# Patient Record
Sex: FEMALE | Race: BLACK OR AFRICAN AMERICAN | URBAN - METROPOLITAN AREA
[De-identification: names, ages, dates, MRNs, and addresses within clinical notes are randomized per-mention and may not be internally consistent; named-entity substitution may affect disease eponyms.]

---

## 2022-07-07 ENCOUNTER — OFFICE VISIT (OUTPATIENT)
Dept: URBAN - METROPOLITAN AREA CLINIC 92 | Facility: CLINIC | Age: 47
End: 2022-07-07
Payer: COMMERCIAL

## 2022-07-07 ENCOUNTER — WEB ENCOUNTER (OUTPATIENT)
Dept: URBAN - METROPOLITAN AREA CLINIC 92 | Facility: CLINIC | Age: 47
End: 2022-07-07

## 2022-07-07 VITALS
BODY MASS INDEX: 42.49 KG/M2 | TEMPERATURE: 98 F | HEART RATE: 101 BPM | SYSTOLIC BLOOD PRESSURE: 122 MMHG | HEIGHT: 65 IN | WEIGHT: 255 LBS | DIASTOLIC BLOOD PRESSURE: 89 MMHG

## 2022-07-07 DIAGNOSIS — Z83.79 FAMILY HISTORY OF CROHN'S DISEASE: ICD-10-CM

## 2022-07-07 DIAGNOSIS — E66.9 OBESITY (BMI 30-39.9): ICD-10-CM

## 2022-07-07 DIAGNOSIS — R19.7 DIARRHEA, UNSPECIFIED TYPE: ICD-10-CM

## 2022-07-07 DIAGNOSIS — R10.813 RIGHT LOWER QUADRANT ABDOMINAL TENDERNESS: ICD-10-CM

## 2022-07-07 PROCEDURE — 99204 OFFICE O/P NEW MOD 45 MIN: CPT | Performed by: INTERNAL MEDICINE

## 2022-07-07 RX ORDER — INSULIN DETEMIR 100 [IU]/ML
INJECT 78 UNITS SUBCUTANEOUSLY ONCE DAILY INJECTION, SOLUTION SUBCUTANEOUS
Qty: 30 MILLILITER | Refills: 0 | Status: ACTIVE | COMMUNITY

## 2022-07-07 RX ORDER — OMEPRAZOLE 20 MG/1
CAPSULE, DELAYED RELEASE ORAL
Qty: 30 CAPSULE | Status: ACTIVE | COMMUNITY

## 2022-07-07 RX ORDER — PREGABALIN 150 MG/1
CAPSULE ORAL
Qty: 180 CAPSULE | Status: ACTIVE | COMMUNITY

## 2022-07-07 RX ORDER — METOPROLOL SUCCINATE 25 MG/1
TABLET, EXTENDED RELEASE ORAL
Qty: 180 TABLET | Status: ACTIVE | COMMUNITY

## 2022-07-07 RX ORDER — DULOXETINE HYDROCHLORIDE 60 MG/1
CAPSULE, DELAYED RELEASE ORAL
Qty: 90 CAPSULE | Status: ACTIVE | COMMUNITY

## 2022-07-07 RX ORDER — ALBUTEROL SULFATE 90 UG/1
INHALE 2 PUFFS BY MOUTH EVERY 6 HOURS AS NEEDED FOR SHORTNESS OF BREATH AEROSOL, METERED RESPIRATORY (INHALATION)
Qty: 18 GRAM | Refills: 2 | Status: ACTIVE | COMMUNITY

## 2022-07-07 RX ORDER — ROSUVASTATIN CALCIUM 40 MG/1
TABLET, FILM COATED ORAL
Qty: 90 TABLET | Status: ACTIVE | COMMUNITY

## 2022-07-07 RX ORDER — INSULIN ASPART 100 [IU]/ML
TAKE 36 UNITS SUBCUTANEOUSLY IN THE MORNING, 38 UNITS AT LUNCH, AND 36 UNITS AT DINNER. SLIDING SCALE IF GLUCOSE OVER 150, ADD 2 UNITS FOR EVERY 50 GLUCOSE READING INJECTION, SOLUTION INTRAVENOUS; SUBCUTANEOUS
Qty: 15 MILLILITER | Refills: 0 | Status: ACTIVE | COMMUNITY

## 2022-07-07 NOTE — HPI-TODAY'S VISIT:
This is a 45 yo female with a PMHx of lupus who presents with a 10 month history of diarrhea.  She was having up to 14 stools a day.  She wakes up in the middle of the night to have a bowel movement.  She also notes blood on the tissue when wiping.  She denies weight loss.  Her last colonoscopy 3 years ago was unremarkable.  She states there has been no change in her medication.  She has not had stool samples.   She reports bilateral pelvic pain for 6 months which precedes a bowel movement.  Pain is described as a cramp. Her mother was diagnosed with colon cancer in her early 60s.  Her daughter has Crohn's disease.   The patient also reports mouth ulcers which developed November 2021 treated with steroids and oral lidocaine.

## 2022-07-20 LAB
A/G RATIO: 0.9
ALBUMIN: 2.9
ALKALINE PHOSPHATASE: 113
ALT (SGPT): 6
AST (SGOT): 19
BILIRUBIN, TOTAL: <0.2
BUN/CREATININE RATIO: 6
BUN: 5
CALCIUM: 8.6
CALPROTECTIN, FECAL: <16
CARBON DIOXIDE, TOTAL: 21
CHLORIDE: 105
CREATININE: 0.77
DEAMIDATED GLIADIN ABS, IGA: 2
DEAMIDATED GLIADIN ABS, IGG: 3
EGFR: 95
ENDOMYSIAL ANTIBODY IGA: NEGATIVE
GLOBULIN, TOTAL: 3.4
GLUCOSE: (no result)
HEMATOCRIT: 33.8
HEMOGLOBIN: 10.9
IMMUNOGLOBULIN A, QN, SERUM: 172
MCH: 26.9
MCHC: 32.2
MCV: 84
NRBC: (no result)
PANCREATIC ELASTASE, FECAL: 403
PLATELETS: 250
POTASSIUM: (no result)
PROTEIN, TOTAL: 6.3
RBC: 4.05
RDW: 15.8
SODIUM: 144
T-TRANSGLUTAMINASE (TTG) IGA: <2
T-TRANSGLUTAMINASE (TTG) IGG: 5
T4,FREE(DIRECT): 0.92
TSH: 3.06
VITAMIN B12: 150
VITAMIN D, 25-HYDROXY: 14.8
WBC: 4

## 2022-07-21 ENCOUNTER — TELEPHONE ENCOUNTER (OUTPATIENT)
Dept: URBAN - METROPOLITAN AREA CLINIC 92 | Facility: CLINIC | Age: 47
End: 2022-07-21

## 2022-07-21 RX ORDER — INSULIN DETEMIR 100 [IU]/ML
INJECT 78 UNITS SUBCUTANEOUSLY ONCE DAILY INJECTION, SOLUTION SUBCUTANEOUS
Qty: 30 MILLILITER | Refills: 0 | Status: ACTIVE | COMMUNITY

## 2022-07-21 RX ORDER — ROSUVASTATIN CALCIUM 40 MG/1
TABLET, FILM COATED ORAL
Qty: 90 TABLET | Status: ACTIVE | COMMUNITY

## 2022-07-21 RX ORDER — OMEPRAZOLE 20 MG/1
CAPSULE, DELAYED RELEASE ORAL
Qty: 30 CAPSULE | Status: ACTIVE | COMMUNITY

## 2022-07-21 RX ORDER — METOPROLOL SUCCINATE 25 MG/1
TABLET, EXTENDED RELEASE ORAL
Qty: 180 TABLET | Status: ACTIVE | COMMUNITY

## 2022-07-21 RX ORDER — PREGABALIN 150 MG/1
CAPSULE ORAL
Qty: 180 CAPSULE | Status: ACTIVE | COMMUNITY

## 2022-07-21 RX ORDER — ALBUTEROL SULFATE 90 UG/1
INHALE 2 PUFFS BY MOUTH EVERY 6 HOURS AS NEEDED FOR SHORTNESS OF BREATH AEROSOL, METERED RESPIRATORY (INHALATION)
Qty: 18 GRAM | Refills: 2 | Status: ACTIVE | COMMUNITY

## 2022-07-21 RX ORDER — INSULIN ASPART 100 [IU]/ML
TAKE 36 UNITS SUBCUTANEOUSLY IN THE MORNING, 38 UNITS AT LUNCH, AND 36 UNITS AT DINNER. SLIDING SCALE IF GLUCOSE OVER 150, ADD 2 UNITS FOR EVERY 50 GLUCOSE READING INJECTION, SOLUTION INTRAVENOUS; SUBCUTANEOUS
Qty: 15 MILLILITER | Refills: 0 | Status: ACTIVE | COMMUNITY

## 2022-07-21 RX ORDER — DULOXETINE HYDROCHLORIDE 60 MG/1
CAPSULE, DELAYED RELEASE ORAL
Qty: 90 CAPSULE | Status: ACTIVE | COMMUNITY

## 2022-07-21 RX ORDER — ERGOCALCIFEROL 1.25 MG/1
1 CAPSULE CAPSULE ORAL
Qty: 14 | Refills: 0 | OUTPATIENT
Start: 2022-07-21 | End: 2022-10-19

## 2022-07-22 LAB — GASTROINTESTINAL PATHOGEN: (no result)

## 2022-07-29 ENCOUNTER — OFFICE VISIT (OUTPATIENT)
Dept: URBAN - METROPOLITAN AREA CLINIC 91 | Facility: CLINIC | Age: 47
End: 2022-07-29
Payer: COMMERCIAL

## 2022-07-29 DIAGNOSIS — E53.8 B12 DEFICIENCY: ICD-10-CM

## 2022-07-29 PROCEDURE — 96372 THER/PROPH/DIAG INJ SC/IM: CPT | Performed by: INTERNAL MEDICINE

## 2022-07-29 RX ORDER — PREGABALIN 150 MG/1
CAPSULE ORAL
Qty: 180 CAPSULE | Status: ACTIVE | COMMUNITY

## 2022-07-29 RX ORDER — OMEPRAZOLE 20 MG/1
CAPSULE, DELAYED RELEASE ORAL
Qty: 30 CAPSULE | Status: ACTIVE | COMMUNITY

## 2022-07-29 RX ORDER — INSULIN ASPART 100 [IU]/ML
TAKE 36 UNITS SUBCUTANEOUSLY IN THE MORNING, 38 UNITS AT LUNCH, AND 36 UNITS AT DINNER. SLIDING SCALE IF GLUCOSE OVER 150, ADD 2 UNITS FOR EVERY 50 GLUCOSE READING INJECTION, SOLUTION INTRAVENOUS; SUBCUTANEOUS
Qty: 15 MILLILITER | Refills: 0 | Status: ACTIVE | COMMUNITY

## 2022-07-29 RX ORDER — ERGOCALCIFEROL 1.25 MG/1
1 CAPSULE CAPSULE ORAL
Qty: 14 | Refills: 0 | Status: ACTIVE | COMMUNITY
Start: 2022-07-21 | End: 2022-10-19

## 2022-07-29 RX ORDER — INSULIN DETEMIR 100 [IU]/ML
INJECT 78 UNITS SUBCUTANEOUSLY ONCE DAILY INJECTION, SOLUTION SUBCUTANEOUS
Qty: 30 MILLILITER | Refills: 0 | Status: ACTIVE | COMMUNITY

## 2022-07-29 RX ORDER — ALBUTEROL SULFATE 90 UG/1
INHALE 2 PUFFS BY MOUTH EVERY 6 HOURS AS NEEDED FOR SHORTNESS OF BREATH AEROSOL, METERED RESPIRATORY (INHALATION)
Qty: 18 GRAM | Refills: 2 | Status: ACTIVE | COMMUNITY

## 2022-07-29 RX ORDER — ROSUVASTATIN CALCIUM 40 MG/1
TABLET, FILM COATED ORAL
Qty: 90 TABLET | Status: ACTIVE | COMMUNITY

## 2022-07-29 RX ORDER — METOPROLOL SUCCINATE 25 MG/1
TABLET, EXTENDED RELEASE ORAL
Qty: 180 TABLET | Status: ACTIVE | COMMUNITY

## 2022-07-29 RX ORDER — DULOXETINE HYDROCHLORIDE 60 MG/1
CAPSULE, DELAYED RELEASE ORAL
Qty: 90 CAPSULE | Status: ACTIVE | COMMUNITY

## 2022-08-05 ENCOUNTER — CLAIMS CREATED FROM THE CLAIM WINDOW (OUTPATIENT)
Dept: URBAN - METROPOLITAN AREA CLINIC 91 | Facility: CLINIC | Age: 47
End: 2022-08-05
Payer: COMMERCIAL

## 2022-08-05 DIAGNOSIS — E53.8 B12 DEFICIENCY: ICD-10-CM

## 2022-08-05 PROCEDURE — 96372 THER/PROPH/DIAG INJ SC/IM: CPT | Performed by: INTERNAL MEDICINE

## 2022-08-05 RX ORDER — ERGOCALCIFEROL 1.25 MG/1
1 CAPSULE CAPSULE ORAL
Qty: 14 | Refills: 0 | Status: ACTIVE | COMMUNITY
Start: 2022-07-21 | End: 2022-10-19

## 2022-08-05 RX ORDER — ROSUVASTATIN CALCIUM 40 MG/1
TABLET, FILM COATED ORAL
Qty: 90 TABLET | Status: ACTIVE | COMMUNITY

## 2022-08-05 RX ORDER — INSULIN DETEMIR 100 [IU]/ML
INJECT 78 UNITS SUBCUTANEOUSLY ONCE DAILY INJECTION, SOLUTION SUBCUTANEOUS
Qty: 30 MILLILITER | Refills: 0 | Status: ACTIVE | COMMUNITY

## 2022-08-05 RX ORDER — OMEPRAZOLE 20 MG/1
CAPSULE, DELAYED RELEASE ORAL
Qty: 30 CAPSULE | Status: ACTIVE | COMMUNITY

## 2022-08-05 RX ORDER — ALBUTEROL SULFATE 90 UG/1
INHALE 2 PUFFS BY MOUTH EVERY 6 HOURS AS NEEDED FOR SHORTNESS OF BREATH AEROSOL, METERED RESPIRATORY (INHALATION)
Qty: 18 GRAM | Refills: 2 | Status: ACTIVE | COMMUNITY

## 2022-08-05 RX ORDER — METOPROLOL SUCCINATE 25 MG/1
TABLET, EXTENDED RELEASE ORAL
Qty: 180 TABLET | Status: ACTIVE | COMMUNITY

## 2022-08-05 RX ORDER — PREGABALIN 150 MG/1
CAPSULE ORAL
Qty: 180 CAPSULE | Status: ACTIVE | COMMUNITY

## 2022-08-05 RX ORDER — INSULIN ASPART 100 [IU]/ML
TAKE 36 UNITS SUBCUTANEOUSLY IN THE MORNING, 38 UNITS AT LUNCH, AND 36 UNITS AT DINNER. SLIDING SCALE IF GLUCOSE OVER 150, ADD 2 UNITS FOR EVERY 50 GLUCOSE READING INJECTION, SOLUTION INTRAVENOUS; SUBCUTANEOUS
Qty: 15 MILLILITER | Refills: 0 | Status: ACTIVE | COMMUNITY

## 2022-08-05 RX ORDER — DULOXETINE HYDROCHLORIDE 60 MG/1
CAPSULE, DELAYED RELEASE ORAL
Qty: 90 CAPSULE | Status: ACTIVE | COMMUNITY

## 2022-08-12 ENCOUNTER — OFFICE VISIT (OUTPATIENT)
Dept: URBAN - METROPOLITAN AREA CLINIC 91 | Facility: CLINIC | Age: 47
End: 2022-08-12
Payer: COMMERCIAL

## 2022-08-12 DIAGNOSIS — E53.8 B12 DEFICIENCY: ICD-10-CM

## 2022-08-12 PROCEDURE — 96372 THER/PROPH/DIAG INJ SC/IM: CPT | Performed by: INTERNAL MEDICINE

## 2022-08-12 RX ORDER — INSULIN DETEMIR 100 [IU]/ML
INJECT 78 UNITS SUBCUTANEOUSLY ONCE DAILY INJECTION, SOLUTION SUBCUTANEOUS
Qty: 30 MILLILITER | Refills: 0 | Status: ACTIVE | COMMUNITY

## 2022-08-12 RX ORDER — DULOXETINE HYDROCHLORIDE 60 MG/1
CAPSULE, DELAYED RELEASE ORAL
Qty: 90 CAPSULE | Status: ACTIVE | COMMUNITY

## 2022-08-12 RX ORDER — ERGOCALCIFEROL 1.25 MG/1
1 CAPSULE CAPSULE ORAL
Qty: 14 | Refills: 0 | Status: ACTIVE | COMMUNITY
Start: 2022-07-21 | End: 2022-10-19

## 2022-08-12 RX ORDER — OMEPRAZOLE 20 MG/1
CAPSULE, DELAYED RELEASE ORAL
Qty: 30 CAPSULE | Status: ACTIVE | COMMUNITY

## 2022-08-12 RX ORDER — PREGABALIN 150 MG/1
CAPSULE ORAL
Qty: 180 CAPSULE | Status: ACTIVE | COMMUNITY

## 2022-08-12 RX ORDER — INSULIN ASPART 100 [IU]/ML
TAKE 36 UNITS SUBCUTANEOUSLY IN THE MORNING, 38 UNITS AT LUNCH, AND 36 UNITS AT DINNER. SLIDING SCALE IF GLUCOSE OVER 150, ADD 2 UNITS FOR EVERY 50 GLUCOSE READING INJECTION, SOLUTION INTRAVENOUS; SUBCUTANEOUS
Qty: 15 MILLILITER | Refills: 0 | Status: ACTIVE | COMMUNITY

## 2022-08-12 RX ORDER — ALBUTEROL SULFATE 90 UG/1
INHALE 2 PUFFS BY MOUTH EVERY 6 HOURS AS NEEDED FOR SHORTNESS OF BREATH AEROSOL, METERED RESPIRATORY (INHALATION)
Qty: 18 GRAM | Refills: 2 | Status: ACTIVE | COMMUNITY

## 2022-08-12 RX ORDER — METOPROLOL SUCCINATE 25 MG/1
TABLET, EXTENDED RELEASE ORAL
Qty: 180 TABLET | Status: ACTIVE | COMMUNITY

## 2022-08-12 RX ORDER — ROSUVASTATIN CALCIUM 40 MG/1
TABLET, FILM COATED ORAL
Qty: 90 TABLET | Status: ACTIVE | COMMUNITY

## 2022-08-16 ENCOUNTER — TELEPHONE ENCOUNTER (OUTPATIENT)
Dept: URBAN - METROPOLITAN AREA CLINIC 17 | Facility: CLINIC | Age: 47
End: 2022-08-16

## 2022-08-19 ENCOUNTER — CLAIMS CREATED FROM THE CLAIM WINDOW (OUTPATIENT)
Dept: URBAN - METROPOLITAN AREA CLINIC 91 | Facility: CLINIC | Age: 47
End: 2022-08-19

## 2022-08-19 ENCOUNTER — OFFICE VISIT (OUTPATIENT)
Dept: URBAN - METROPOLITAN AREA CLINIC 91 | Facility: CLINIC | Age: 47
End: 2022-08-19

## 2022-08-19 PROCEDURE — 996 AG2 (NON BILLABLE): Performed by: INTERNAL MEDICINE

## 2022-08-25 ENCOUNTER — CLAIMS CREATED FROM THE CLAIM WINDOW (OUTPATIENT)
Dept: URBAN - METROPOLITAN AREA CLINIC 92 | Facility: CLINIC | Age: 47
End: 2022-08-25
Payer: COMMERCIAL

## 2022-08-25 ENCOUNTER — TELEPHONE ENCOUNTER (OUTPATIENT)
Dept: URBAN - METROPOLITAN AREA CLINIC 92 | Facility: CLINIC | Age: 47
End: 2022-08-25

## 2022-08-25 ENCOUNTER — OFFICE VISIT (OUTPATIENT)
Dept: URBAN - METROPOLITAN AREA CLINIC 92 | Facility: CLINIC | Age: 47
End: 2022-08-25

## 2022-08-25 ENCOUNTER — CLAIMS CREATED FROM THE CLAIM WINDOW (OUTPATIENT)
Dept: URBAN - METROPOLITAN AREA CLINIC 92 | Facility: CLINIC | Age: 47
End: 2022-08-25

## 2022-08-25 DIAGNOSIS — E53.8 B12 DEFICIENCY: ICD-10-CM

## 2022-08-25 PROCEDURE — 96372 THER/PROPH/DIAG INJ SC/IM: CPT | Performed by: INTERNAL MEDICINE

## 2022-08-25 RX ORDER — PREGABALIN 150 MG/1
CAPSULE ORAL
Qty: 180 CAPSULE | Status: ACTIVE | COMMUNITY

## 2022-08-25 RX ORDER — ALBUTEROL SULFATE 90 UG/1
INHALE 2 PUFFS BY MOUTH EVERY 6 HOURS AS NEEDED FOR SHORTNESS OF BREATH AEROSOL, METERED RESPIRATORY (INHALATION)
Qty: 18 GRAM | Refills: 2 | Status: ACTIVE | COMMUNITY

## 2022-08-25 RX ORDER — ROSUVASTATIN CALCIUM 40 MG/1
TABLET, FILM COATED ORAL
Qty: 90 TABLET | Status: ACTIVE | COMMUNITY

## 2022-08-25 RX ORDER — INSULIN ASPART 100 [IU]/ML
TAKE 36 UNITS SUBCUTANEOUSLY IN THE MORNING, 38 UNITS AT LUNCH, AND 36 UNITS AT DINNER. SLIDING SCALE IF GLUCOSE OVER 150, ADD 2 UNITS FOR EVERY 50 GLUCOSE READING INJECTION, SOLUTION INTRAVENOUS; SUBCUTANEOUS
Qty: 15 MILLILITER | Refills: 0 | Status: ACTIVE | COMMUNITY

## 2022-08-25 RX ORDER — CYANOCOBALAMIN 1000 UG/ML
1 ML INJECTION INTRAMUSCULAR; SUBCUTANEOUS
Qty: 30 ML | Refills: 12 | OUTPATIENT
Start: 2022-08-25 | End: 2023-09-19

## 2022-08-25 RX ORDER — METOPROLOL SUCCINATE 25 MG/1
TABLET, EXTENDED RELEASE ORAL
Qty: 180 TABLET | Status: ACTIVE | COMMUNITY

## 2022-08-25 RX ORDER — OMEPRAZOLE 20 MG/1
CAPSULE, DELAYED RELEASE ORAL
Qty: 30 CAPSULE | Status: ACTIVE | COMMUNITY

## 2022-08-25 RX ORDER — ERGOCALCIFEROL 1.25 MG/1
1 CAPSULE CAPSULE ORAL
Qty: 14 | Refills: 0 | Status: ACTIVE | COMMUNITY
Start: 2022-07-21 | End: 2022-10-19

## 2022-08-25 RX ORDER — DULOXETINE HYDROCHLORIDE 60 MG/1
CAPSULE, DELAYED RELEASE ORAL
Qty: 90 CAPSULE | Status: ACTIVE | COMMUNITY

## 2022-08-25 RX ORDER — INSULIN DETEMIR 100 [IU]/ML
INJECT 78 UNITS SUBCUTANEOUSLY ONCE DAILY INJECTION, SOLUTION SUBCUTANEOUS
Qty: 30 MILLILITER | Refills: 0 | Status: ACTIVE | COMMUNITY

## 2022-10-14 ENCOUNTER — TELEPHONE ENCOUNTER (OUTPATIENT)
Dept: URBAN - METROPOLITAN AREA CLINIC 92 | Facility: CLINIC | Age: 47
End: 2022-10-14

## 2022-10-18 ENCOUNTER — LAB OUTSIDE AN ENCOUNTER (OUTPATIENT)
Dept: URBAN - METROPOLITAN AREA CLINIC 17 | Facility: CLINIC | Age: 47
End: 2022-10-18

## 2022-10-19 LAB
HEMATOCRIT: 31.9
HEMOGLOBIN: 9.8
MCH: 25.7
MCHC: 30.7
MCV: 83.5
MPV: (no result)
PLATELET COUNT: 141
RDW: 15.8
RED BLOOD CELL COUNT: 3.82
VITAMIN B12: 477
WHITE BLOOD CELL COUNT: 3.7

## 2022-10-20 ENCOUNTER — OFFICE VISIT (OUTPATIENT)
Dept: URBAN - METROPOLITAN AREA SURGERY CENTER 16 | Facility: SURGERY CENTER | Age: 47
End: 2022-10-20

## 2022-10-24 ENCOUNTER — CLAIMS CREATED FROM THE CLAIM WINDOW (OUTPATIENT)
Dept: URBAN - METROPOLITAN AREA TELEHEALTH 2 | Facility: TELEHEALTH | Age: 47
End: 2022-10-24
Payer: COMMERCIAL

## 2022-10-24 ENCOUNTER — TELEPHONE ENCOUNTER (OUTPATIENT)
Dept: URBAN - METROPOLITAN AREA CLINIC 92 | Facility: CLINIC | Age: 47
End: 2022-10-24

## 2022-10-24 VITALS — WEIGHT: 255 LBS | HEIGHT: 65 IN | BODY MASS INDEX: 42.49 KG/M2

## 2022-10-24 DIAGNOSIS — I82.3 RENAL VEIN THROMBOSIS: ICD-10-CM

## 2022-10-24 DIAGNOSIS — E66.9 OBESITY (BMI 30-39.9): ICD-10-CM

## 2022-10-24 DIAGNOSIS — E85.9 AMYLOIDOSIS, UNSPECIFIED TYPE: ICD-10-CM

## 2022-10-24 DIAGNOSIS — E55.9 VITAMIN D DEFICIENCY: ICD-10-CM

## 2022-10-24 DIAGNOSIS — R19.7 DIARRHEA, UNSPECIFIED TYPE: ICD-10-CM

## 2022-10-24 DIAGNOSIS — E53.8 B12 DEFICIENCY: ICD-10-CM

## 2022-10-24 PROBLEM — 17602002: Status: ACTIVE | Noted: 2022-10-24

## 2022-10-24 PROBLEM — 15842009: Status: ACTIVE | Noted: 2022-10-24

## 2022-10-24 PROBLEM — 162864005: Status: ACTIVE | Noted: 2022-07-07

## 2022-10-24 PROBLEM — 34713006: Status: ACTIVE | Noted: 2022-07-21

## 2022-10-24 PROCEDURE — 99214 OFFICE O/P EST MOD 30 MIN: CPT | Performed by: INTERNAL MEDICINE

## 2022-10-24 RX ORDER — ERGOCALCIFEROL 1.25 MG/1
1 CAPSULE CAPSULE ORAL
Qty: 14 | Refills: 1 | OUTPATIENT
Start: 2022-10-24 | End: 2023-04-22

## 2022-10-24 RX ORDER — ROSUVASTATIN CALCIUM 40 MG/1
TABLET, FILM COATED ORAL
Qty: 90 TABLET | Status: ACTIVE | COMMUNITY

## 2022-10-24 RX ORDER — METOPROLOL SUCCINATE 25 MG/1
TABLET, EXTENDED RELEASE ORAL
Qty: 180 TABLET | Status: ACTIVE | COMMUNITY

## 2022-10-24 RX ORDER — INSULIN DETEMIR 100 [IU]/ML
INJECT 78 UNITS SUBCUTANEOUSLY ONCE DAILY INJECTION, SOLUTION SUBCUTANEOUS
Qty: 30 MILLILITER | Refills: 0 | Status: ACTIVE | COMMUNITY

## 2022-10-24 RX ORDER — OMEPRAZOLE 20 MG/1
CAPSULE, DELAYED RELEASE ORAL
Qty: 30 CAPSULE | Status: ACTIVE | COMMUNITY

## 2022-10-24 RX ORDER — ALBUTEROL SULFATE 90 UG/1
INHALE 2 PUFFS BY MOUTH EVERY 6 HOURS AS NEEDED FOR SHORTNESS OF BREATH AEROSOL, METERED RESPIRATORY (INHALATION)
Qty: 18 GRAM | Refills: 2 | Status: ACTIVE | COMMUNITY

## 2022-10-24 RX ORDER — DULOXETINE HYDROCHLORIDE 60 MG/1
CAPSULE, DELAYED RELEASE ORAL
Qty: 90 CAPSULE | Status: ACTIVE | COMMUNITY

## 2022-10-24 RX ORDER — PREGABALIN 150 MG/1
CAPSULE ORAL
Qty: 180 CAPSULE | Status: ACTIVE | COMMUNITY

## 2022-10-24 RX ORDER — INSULIN ASPART 100 [IU]/ML
TAKE 36 UNITS SUBCUTANEOUSLY IN THE MORNING, 38 UNITS AT LUNCH, AND 36 UNITS AT DINNER. SLIDING SCALE IF GLUCOSE OVER 150, ADD 2 UNITS FOR EVERY 50 GLUCOSE READING INJECTION, SOLUTION INTRAVENOUS; SUBCUTANEOUS
Qty: 15 MILLILITER | Refills: 0 | Status: ACTIVE | COMMUNITY

## 2022-10-24 RX ORDER — CYANOCOBALAMIN 1000 UG/ML
1 ML INJECTION INTRAMUSCULAR; SUBCUTANEOUS
Qty: 30 ML | Refills: 12 | Status: ACTIVE | COMMUNITY
Start: 2022-08-25 | End: 2023-09-19

## 2022-10-24 NOTE — HPI-OTHER HISTORIES
(July 2022) This is a 45 yo female with a PMHx of lupus who presents with a 10 month history of diarrhea.  She was having up to 14 stools a day.  She wakes up in the middle of the night to have a bowel movement.  She also notes blood on the tissue when wiping.  She denies weight loss.  Her last colonoscopy 3 years ago was unremarkable.  She states there has been no change in her medication.  She has not had stool samples.   She reports bilateral pelvic pain for 6 months which precedes a bowel movement.  Pain is described as a cramp. Her mother was diagnosed with colon cancer in her early 60s.  Her daughter has Crohn's disease.   The patient also reports mouth ulcers which developed November 2021 treated with steroids and oral lidocaine.

## 2022-10-24 NOTE — HPI-TODAY'S VISIT:
This is a 47 yo female with a PMHx of lupus and a family history of lupus and colon cancer (mother) who is here for follow up of diarrhea.  The patient was last seen July 2022.  At that time an EGD/colonoscopy were scheduled for October 20th.  August 2022 she presented to the hospital with abdominal pain.  A CT of the abdomen demonstrated a thrombus in the left renal vein.  It was not clear if this was due to lupus nephritis therefore she was admitted for further evaluation.  She was discharged on eliquis.  The EGD/colon was postponed since she was on anticoagulation.  Stool studies July 2022 were negative for fecal calprotectin.  B12 was very low at 150.  B12 injections were initiated.  Vitamin D was low at 14.  She is now on weekly vitamin D. Patient continues to have significant diarrhea.  She was recently diagnosed with amyloidosis.  She had a bone marrow biopsy done.  The path was sent to the [facility].  Multiple Myeloma is suspected.

## 2023-01-12 ENCOUNTER — OFFICE VISIT (OUTPATIENT)
Dept: URBAN - METROPOLITAN AREA CLINIC 92 | Facility: CLINIC | Age: 48
End: 2023-01-12

## 2023-01-12 RX ORDER — ERGOCALCIFEROL 1.25 MG/1
1 CAPSULE CAPSULE ORAL
Qty: 14 | Refills: 1 | COMMUNITY
Start: 2022-10-24 | End: 2023-04-22

## 2023-01-12 RX ORDER — INSULIN DETEMIR 100 [IU]/ML
INJECT 78 UNITS SUBCUTANEOUSLY ONCE DAILY INJECTION, SOLUTION SUBCUTANEOUS
Qty: 30 MILLILITER | Refills: 0 | COMMUNITY

## 2023-01-12 RX ORDER — ROSUVASTATIN CALCIUM 40 MG/1
TABLET, FILM COATED ORAL
Qty: 90 TABLET | COMMUNITY

## 2023-01-12 RX ORDER — PREGABALIN 150 MG/1
CAPSULE ORAL
Qty: 180 CAPSULE | COMMUNITY

## 2023-01-12 RX ORDER — OMEPRAZOLE 20 MG/1
CAPSULE, DELAYED RELEASE ORAL
Qty: 30 CAPSULE | COMMUNITY

## 2023-01-12 RX ORDER — DULOXETINE HYDROCHLORIDE 60 MG/1
CAPSULE, DELAYED RELEASE ORAL
Qty: 90 CAPSULE | COMMUNITY

## 2023-01-12 RX ORDER — METOPROLOL SUCCINATE 25 MG/1
TABLET, EXTENDED RELEASE ORAL
Qty: 180 TABLET | COMMUNITY

## 2023-01-12 RX ORDER — CYANOCOBALAMIN 1000 UG/ML
1 ML INJECTION INTRAMUSCULAR; SUBCUTANEOUS
Qty: 30 ML | Refills: 12 | COMMUNITY
Start: 2022-08-25 | End: 2023-09-19

## 2023-01-12 RX ORDER — INSULIN ASPART 100 [IU]/ML
TAKE 36 UNITS SUBCUTANEOUSLY IN THE MORNING, 38 UNITS AT LUNCH, AND 36 UNITS AT DINNER. SLIDING SCALE IF GLUCOSE OVER 150, ADD 2 UNITS FOR EVERY 50 GLUCOSE READING INJECTION, SOLUTION INTRAVENOUS; SUBCUTANEOUS
Qty: 15 MILLILITER | Refills: 0 | COMMUNITY

## 2023-01-12 RX ORDER — ALBUTEROL SULFATE 90 UG/1
INHALE 2 PUFFS BY MOUTH EVERY 6 HOURS AS NEEDED FOR SHORTNESS OF BREATH AEROSOL, METERED RESPIRATORY (INHALATION)
Qty: 18 GRAM | Refills: 2 | COMMUNITY

## 2023-06-28 ENCOUNTER — WEB ENCOUNTER (OUTPATIENT)
Dept: URBAN - METROPOLITAN AREA CLINIC 92 | Facility: CLINIC | Age: 48
End: 2023-06-28

## 2023-06-28 ENCOUNTER — OFFICE VISIT (OUTPATIENT)
Dept: URBAN - METROPOLITAN AREA CLINIC 92 | Facility: CLINIC | Age: 48
End: 2023-06-28
Payer: COMMERCIAL

## 2023-06-28 ENCOUNTER — LAB OUTSIDE AN ENCOUNTER (OUTPATIENT)
Dept: URBAN - METROPOLITAN AREA CLINIC 92 | Facility: CLINIC | Age: 48
End: 2023-06-28

## 2023-06-28 VITALS
HEART RATE: 94 BPM | TEMPERATURE: 97.1 F | DIASTOLIC BLOOD PRESSURE: 85 MMHG | WEIGHT: 289 LBS | HEIGHT: 65 IN | BODY MASS INDEX: 48.15 KG/M2 | SYSTOLIC BLOOD PRESSURE: 123 MMHG

## 2023-06-28 DIAGNOSIS — E53.8 B12 DEFICIENCY: ICD-10-CM

## 2023-06-28 DIAGNOSIS — R19.7 DIARRHEA, UNSPECIFIED TYPE: ICD-10-CM

## 2023-06-28 PROCEDURE — 99215 OFFICE O/P EST HI 40 MIN: CPT | Performed by: INTERNAL MEDICINE

## 2023-06-28 RX ORDER — CYANOCOBALAMIN 1000 UG/ML
1 ML INJECTION INTRAMUSCULAR; SUBCUTANEOUS
Qty: 30 ML | Refills: 12 | Status: DISCONTINUED | COMMUNITY
Start: 2022-08-25 | End: 2023-09-19

## 2023-06-28 RX ORDER — ROSUVASTATIN CALCIUM 40 MG/1
1 TABLET TABLET, COATED ORAL ONCE A DAY
Status: ACTIVE | COMMUNITY

## 2023-06-28 RX ORDER — INSULIN ASPART 100 [IU]/ML
TAKE 36 UNITS SUBCUTANEOUSLY IN THE MORNING, 38 UNITS AT LUNCH, AND 36 UNITS AT DINNER. SLIDING SCALE IF GLUCOSE OVER 150, ADD 2 UNITS FOR EVERY 50 GLUCOSE READING INJECTION, SOLUTION INTRAVENOUS; SUBCUTANEOUS
Qty: 15 MILLILITER | Refills: 0 | Status: DISCONTINUED | COMMUNITY

## 2023-06-28 RX ORDER — ROSUVASTATIN CALCIUM 40 MG/1
TABLET, FILM COATED ORAL
Qty: 90 TABLET | Status: DISCONTINUED | COMMUNITY

## 2023-06-28 RX ORDER — PREDNISONE 5 MG/1
1 TABLET TABLET ORAL ONCE A DAY
Status: ACTIVE | COMMUNITY

## 2023-06-28 RX ORDER — INSULIN ASPART 100 [IU]/ML
AS DIRECTED INJECTION, SOLUTION INTRAVENOUS; SUBCUTANEOUS
Status: ACTIVE | COMMUNITY

## 2023-06-28 RX ORDER — OMEPRAZOLE 20 MG/1
CAPSULE, DELAYED RELEASE ORAL
Qty: 30 CAPSULE | Status: DISCONTINUED | COMMUNITY

## 2023-06-28 RX ORDER — BUMETANIDE 2 MG/1
1 TABLET TABLET ORAL ONCE A DAY
Status: ACTIVE | COMMUNITY

## 2023-06-28 RX ORDER — INSULIN DETEMIR 100 [IU]/ML
INJECT 78 UNITS SUBCUTANEOUSLY ONCE DAILY INJECTION, SOLUTION SUBCUTANEOUS
Qty: 30 MILLILITER | Refills: 0 | Status: DISCONTINUED | COMMUNITY

## 2023-06-28 RX ORDER — FOLIC ACID 0.8 MG
1 TABLET TABLET ORAL ONCE A DAY
Status: ACTIVE | COMMUNITY

## 2023-06-28 RX ORDER — DULOXETINE 60 MG/1
1 CAPSULE CAPSULE, DELAYED RELEASE PELLETS ORAL ONCE A DAY
Status: ACTIVE | COMMUNITY

## 2023-06-28 RX ORDER — ONDANSETRON 8 MG/1
1 TABLET ON THE TONGUE AND ALLOW TO DISSOLVE  AS NEEDED TABLET, ORALLY DISINTEGRATING ORAL ONCE A DAY
Status: ACTIVE | COMMUNITY

## 2023-06-28 RX ORDER — PREGABALIN 150 MG/1
1 CAPSULE CAPSULE ORAL ONCE A DAY
Status: ACTIVE | COMMUNITY

## 2023-06-28 RX ORDER — ALBUTEROL SULFATE 90 UG/1
INHALE 2 PUFFS BY MOUTH EVERY 6 HOURS AS NEEDED FOR SHORTNESS OF BREATH AEROSOL, METERED RESPIRATORY (INHALATION)
Qty: 18 GRAM | Refills: 2 | Status: DISCONTINUED | COMMUNITY

## 2023-06-28 RX ORDER — METHOCARBAMOL 500 MG/1
1.5 TABLETS TABLET ORAL
Status: ACTIVE | COMMUNITY

## 2023-06-28 RX ORDER — DULOXETINE HYDROCHLORIDE 60 MG/1
CAPSULE, DELAYED RELEASE ORAL
Qty: 90 CAPSULE | Status: DISCONTINUED | COMMUNITY

## 2023-06-28 RX ORDER — OMEPRAZOLE 20 MG/1
1 CAPSULE 30 MINUTES BEFORE MORNING MEAL CAPSULE, DELAYED RELEASE ORAL ONCE A DAY
Status: ACTIVE | COMMUNITY

## 2023-06-28 RX ORDER — CYANOCOBALAMIN 1000 UG/ML
1 ML INJECTION INTRAMUSCULAR; SUBCUTANEOUS
Qty: 8 UNSPECIFIED | Refills: 0 | OUTPATIENT
Start: 2023-06-28 | End: 2023-07-28

## 2023-06-28 RX ORDER — PREGABALIN 150 MG/1
CAPSULE ORAL
Qty: 180 CAPSULE | Status: DISCONTINUED | COMMUNITY

## 2023-06-28 RX ORDER — INSULIN DETEMIR 100 [IU]/ML
AS DIRECTED INJECTION, SOLUTION SUBCUTANEOUS
Status: ACTIVE | COMMUNITY

## 2023-06-28 RX ORDER — METOPROLOL SUCCINATE 25 MG/1
TABLET, EXTENDED RELEASE ORAL
Qty: 180 TABLET | Status: DISCONTINUED | COMMUNITY

## 2023-06-28 RX ORDER — CYCLOBENZAPRINE HYDROCHLORIDE 10 MG/1
1 TABLET AT BEDTIME AS NEEDED TABLET, FILM COATED ORAL ONCE A DAY
Status: ACTIVE | COMMUNITY

## 2023-06-28 RX ORDER — HYDROXYCHLOROQUINE SULFATE 200 MG/1
AS DIRECTED TABLET, FILM COATED ORAL
Status: ACTIVE | COMMUNITY

## 2023-06-28 RX ORDER — EZETIMIBE 10 MG/1
1 TABLET TABLET ORAL ONCE A DAY
Status: ACTIVE | COMMUNITY

## 2023-06-28 RX ORDER — DABIGATRAN ETEXILATE 150 MG/1
1 CAPSULE CAPSULE ORAL TWICE A DAY
Status: ACTIVE | COMMUNITY

## 2023-06-28 NOTE — HPI-TODAY'S VISIT:
This is a 47 yo female with a PMHx of lupus and a family history of lupus and colon cancer (mother) who is here for follow up of diarrhea.  The patient was last seen July 2022.  At that time an EGD/colonoscopy were scheduled for October 20th.  August 2022 she presented to the hospital with abdominal pain.  A CT of the abdomen demonstrated a thrombus in the left renal vein.  It was not clear if this was due to lupus nephritis therefore she was admitted for further evaluation.  She was discharged on eliquis.  The EGD/colon was postponed since she was on anticoagulation.  Stool studies July 2022 were negative for fecal calprotectin.  B12 was very low at 150.  B12 injections were initiated.  Vitamin D was low at 14.  She is now on weekly vitamin D. Patient continues to have significant diarrhea.  She was recently diagnosed with amyloidosis.  She had a bone marrow biopsy done.  The path was sent to the [facility].  Multiple Myeloma is suspected.  ***6/28/23: Here for ongoing severe diarrhea. Colonoscopy 4 years ago with polyps - was having diarrhea at that time, which they thought was 2/2 medications, per patient (done at [place]). Diarrhea for years, episodic. Recently reduced Cellcept and then stopped it and switched to Plaquenil. Having up to 12-15 BMs/day, not dark, no BRBPR. Soft stools. Went to ER yesterday b/c was concerned about dehydration 2/2 diarrhea, but left. Albumin 1.7. Protein 6.5. On Pradaxa for PE, most recently dx 2/2023. Hgb 9.4. K nl. Has to wear depends, has a lot of urgency. Nocturnal BMs. Not much improvement since off the Cellcept. Ran out of B12. Has abdominal cramping. 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 Last B12 216. Fecal elastase, celiac negative.

## 2023-07-05 ENCOUNTER — LAB OUTSIDE AN ENCOUNTER (OUTPATIENT)
Dept: URBAN - METROPOLITAN AREA CLINIC 92 | Facility: CLINIC | Age: 48
End: 2023-07-05

## 2023-07-09 LAB
ADENOVIRUS F 40/41: NOT DETECTED
CAMPYLOBACTER: NOT DETECTED
CLOSTRIDIUM DIFFICILE: NOT DETECTED
CRYPTOSPORIDIUM: NOT DETECTED
CYCLOSPORA CAYETANESIS: NOT DETECTED
E. COLI O157: (no result)
ENTAMOEBA HISTOLYTICA: NOT DETECTED
ENTAMOEBA HISTOLYTICA: NOT DETECTED
ENTEROAGGREGATIVE E.COLI: NOT DETECTED
ENTEROTOXIGENIC E.COLI: NOT DETECTED
ESCHERICHIA COLI O157: NOT DETECTED
GIARDIA LAMBLIA: NOT DETECTED
NOROVIRUS GI/GII: NOT DETECTED
NOROVIRUS GI/GII: NOT DETECTED
ROTAVIRUS A: NOT DETECTED
SHIGA-LIKE TOXIN PRODUCING E.COLI: NOT DETECTED
SHIGA-LIKE TOXIN PRODUCING E.COLI: NOT DETECTED
SHIGELLA SPP. / ENTEROINVASIVE E.COLI: NOT DETECTED
VIBRIO CHOLERAE: NOT DETECTED
VIBRIO SPP.: NOT DETECTED
YERSINIA ENTEROCOLITICA: NOT DETECTED
YERSINIA ENTEROCOLITICA: NOT DETECTED

## 2023-07-26 ENCOUNTER — OFFICE VISIT (OUTPATIENT)
Dept: URBAN - METROPOLITAN AREA MEDICAL CENTER 12 | Facility: MEDICAL CENTER | Age: 48
End: 2023-07-26
Payer: COMMERCIAL

## 2023-07-26 ENCOUNTER — OFFICE VISIT (OUTPATIENT)
Dept: URBAN - METROPOLITAN AREA SURGERY CENTER 16 | Facility: SURGERY CENTER | Age: 48
End: 2023-07-26

## 2023-07-26 DIAGNOSIS — R10.84 ABDOMINAL CRAMPING, GENERALIZED: ICD-10-CM

## 2023-07-26 DIAGNOSIS — R10.13 ABDOMINAL DISCOMFORT, EPIGASTRIC: ICD-10-CM

## 2023-07-26 DIAGNOSIS — R19.7 ACUTE DIARRHEA: ICD-10-CM

## 2023-07-26 DIAGNOSIS — K63.89 APPENDICITIS EPIPLOICA: ICD-10-CM

## 2023-07-26 DIAGNOSIS — K31.89 ACQUIRED DEFORMITY OF DUODENUM: ICD-10-CM

## 2023-07-26 PROCEDURE — 43239 EGD BIOPSY SINGLE/MULTIPLE: CPT | Performed by: INTERNAL MEDICINE

## 2023-07-26 PROCEDURE — 45380 COLONOSCOPY AND BIOPSY: CPT | Performed by: INTERNAL MEDICINE

## 2023-07-26 RX ORDER — CYANOCOBALAMIN 1000 UG/ML
1 ML INJECTION INTRAMUSCULAR; SUBCUTANEOUS
Qty: 8 UNSPECIFIED | Refills: 0 | Status: ACTIVE | COMMUNITY
Start: 2023-06-28 | End: 2023-07-28

## 2023-07-26 RX ORDER — PREDNISONE 5 MG/1
1 TABLET TABLET ORAL ONCE A DAY
Status: ACTIVE | COMMUNITY

## 2023-07-26 RX ORDER — ROSUVASTATIN CALCIUM 40 MG/1
1 TABLET TABLET, COATED ORAL ONCE A DAY
Status: ACTIVE | COMMUNITY

## 2023-07-26 RX ORDER — FOLIC ACID 0.8 MG
1 TABLET TABLET ORAL ONCE A DAY
Status: ACTIVE | COMMUNITY

## 2023-07-26 RX ORDER — CYCLOBENZAPRINE HYDROCHLORIDE 10 MG/1
1 TABLET AT BEDTIME AS NEEDED TABLET, FILM COATED ORAL ONCE A DAY
Status: ACTIVE | COMMUNITY

## 2023-07-26 RX ORDER — HYDROXYCHLOROQUINE SULFATE 200 MG/1
AS DIRECTED TABLET, FILM COATED ORAL
Status: ACTIVE | COMMUNITY

## 2023-07-26 RX ORDER — OMEPRAZOLE 20 MG/1
1 CAPSULE 30 MINUTES BEFORE MORNING MEAL CAPSULE, DELAYED RELEASE ORAL ONCE A DAY
Status: ACTIVE | COMMUNITY

## 2023-07-26 RX ORDER — BUMETANIDE 2 MG/1
1 TABLET TABLET ORAL ONCE A DAY
Status: ACTIVE | COMMUNITY

## 2023-07-26 RX ORDER — EZETIMIBE 10 MG/1
1 TABLET TABLET ORAL ONCE A DAY
Status: ACTIVE | COMMUNITY

## 2023-07-26 RX ORDER — INSULIN ASPART 100 [IU]/ML
AS DIRECTED INJECTION, SOLUTION INTRAVENOUS; SUBCUTANEOUS
Status: ACTIVE | COMMUNITY

## 2023-07-26 RX ORDER — DULOXETINE 60 MG/1
1 CAPSULE CAPSULE, DELAYED RELEASE PELLETS ORAL ONCE A DAY
Status: ACTIVE | COMMUNITY

## 2023-07-26 RX ORDER — ONDANSETRON 8 MG/1
1 TABLET ON THE TONGUE AND ALLOW TO DISSOLVE  AS NEEDED TABLET, ORALLY DISINTEGRATING ORAL ONCE A DAY
Status: ACTIVE | COMMUNITY

## 2023-07-26 RX ORDER — DABIGATRAN ETEXILATE 150 MG/1
1 CAPSULE CAPSULE ORAL TWICE A DAY
Status: ACTIVE | COMMUNITY

## 2023-07-26 RX ORDER — INSULIN DETEMIR 100 [IU]/ML
AS DIRECTED INJECTION, SOLUTION SUBCUTANEOUS
Status: ACTIVE | COMMUNITY

## 2023-07-26 RX ORDER — METHOCARBAMOL 500 MG/1
1.5 TABLETS TABLET ORAL
Status: ACTIVE | COMMUNITY

## 2023-07-26 RX ORDER — PREGABALIN 150 MG/1
1 CAPSULE CAPSULE ORAL ONCE A DAY
Status: ACTIVE | COMMUNITY

## 2023-08-02 ENCOUNTER — LAB OUTSIDE AN ENCOUNTER (OUTPATIENT)
Dept: URBAN - METROPOLITAN AREA CLINIC 92 | Facility: CLINIC | Age: 48
End: 2023-08-02

## 2023-08-02 ENCOUNTER — WEB ENCOUNTER (OUTPATIENT)
Dept: URBAN - METROPOLITAN AREA CLINIC 92 | Facility: CLINIC | Age: 48
End: 2023-08-02

## 2023-08-02 ENCOUNTER — OFFICE VISIT (OUTPATIENT)
Dept: URBAN - METROPOLITAN AREA CLINIC 92 | Facility: CLINIC | Age: 48
End: 2023-08-02
Payer: COMMERCIAL

## 2023-08-02 ENCOUNTER — DASHBOARD ENCOUNTERS (OUTPATIENT)
Age: 48
End: 2023-08-02

## 2023-08-02 VITALS
HEART RATE: 93 BPM | SYSTOLIC BLOOD PRESSURE: 155 MMHG | HEIGHT: 65 IN | BODY MASS INDEX: 47.98 KG/M2 | DIASTOLIC BLOOD PRESSURE: 105 MMHG | WEIGHT: 288 LBS | TEMPERATURE: 97.1 F

## 2023-08-02 DIAGNOSIS — R19.7 DIARRHEA, UNSPECIFIED TYPE: ICD-10-CM

## 2023-08-02 DIAGNOSIS — R10.84 DIFFUSE ABDOMINAL PAIN: ICD-10-CM

## 2023-08-02 DIAGNOSIS — E53.8 B12 DEFICIENCY: ICD-10-CM

## 2023-08-02 PROCEDURE — 99214 OFFICE O/P EST MOD 30 MIN: CPT | Performed by: INTERNAL MEDICINE

## 2023-08-02 RX ORDER — BUMETANIDE 2 MG/1
1 TABLET TABLET ORAL ONCE A DAY
Status: ACTIVE | COMMUNITY

## 2023-08-02 RX ORDER — EZETIMIBE 10 MG/1
1 TABLET TABLET ORAL ONCE A DAY
Status: ACTIVE | COMMUNITY

## 2023-08-02 RX ORDER — ROSUVASTATIN CALCIUM 40 MG/1
1 TABLET TABLET, COATED ORAL ONCE A DAY
Status: ACTIVE | COMMUNITY

## 2023-08-02 RX ORDER — CHOLESTYRAMINE POWDER FOR SUSPENSION 4 G/8.78G
1 PACKET MIXED WITH WATER OR NON-CARBONATED DRINK POWDER, FOR SUSPENSION ORAL
Qty: 60 PACKETS | Refills: 11 | OUTPATIENT
Start: 2023-08-02

## 2023-08-02 RX ORDER — HYDROXYCHLOROQUINE SULFATE 200 MG/1
AS DIRECTED TABLET, FILM COATED ORAL
Status: ACTIVE | COMMUNITY

## 2023-08-02 RX ORDER — METHOCARBAMOL 500 MG/1
1.5 TABLETS TABLET ORAL
Status: ACTIVE | COMMUNITY

## 2023-08-02 RX ORDER — DABIGATRAN ETEXILATE 150 MG/1
1 CAPSULE CAPSULE ORAL TWICE A DAY
Status: ACTIVE | COMMUNITY

## 2023-08-02 RX ORDER — DULOXETINE 60 MG/1
1 CAPSULE CAPSULE, DELAYED RELEASE PELLETS ORAL ONCE A DAY
Status: ACTIVE | COMMUNITY

## 2023-08-02 RX ORDER — PREDNISONE 5 MG/1
1 TABLET TABLET ORAL ONCE A DAY
Status: ACTIVE | COMMUNITY

## 2023-08-02 RX ORDER — PREGABALIN 150 MG/1
1 CAPSULE CAPSULE ORAL ONCE A DAY
Status: ACTIVE | COMMUNITY

## 2023-08-02 RX ORDER — INSULIN ASPART 100 [IU]/ML
AS DIRECTED INJECTION, SOLUTION INTRAVENOUS; SUBCUTANEOUS
Status: ACTIVE | COMMUNITY

## 2023-08-02 RX ORDER — ONDANSETRON 8 MG/1
1 TABLET ON THE TONGUE AND ALLOW TO DISSOLVE  AS NEEDED TABLET, ORALLY DISINTEGRATING ORAL ONCE A DAY
Status: ACTIVE | COMMUNITY

## 2023-08-02 RX ORDER — FOLIC ACID 0.8 MG
1 TABLET TABLET ORAL ONCE A DAY
Status: ACTIVE | COMMUNITY

## 2023-08-02 RX ORDER — OMEPRAZOLE 20 MG/1
1 CAPSULE 30 MINUTES BEFORE MORNING MEAL CAPSULE, DELAYED RELEASE ORAL ONCE A DAY
Status: ACTIVE | COMMUNITY

## 2023-08-02 RX ORDER — INSULIN DETEMIR 100 [IU]/ML
AS DIRECTED INJECTION, SOLUTION SUBCUTANEOUS
Status: ACTIVE | COMMUNITY

## 2023-08-02 RX ORDER — CYCLOBENZAPRINE HYDROCHLORIDE 10 MG/1
1 TABLET AT BEDTIME AS NEEDED TABLET, FILM COATED ORAL ONCE A DAY
Status: ACTIVE | COMMUNITY

## 2023-08-02 NOTE — HPI-TODAY'S VISIT:
This is a 45 yo female with a PMHx of lupus and a family history of lupus and colon cancer (mother) who is here for follow up of diarrhea.  The patient was last seen July 2022.  At that time an EGD/colonoscopy were scheduled for October 20th.  August 2022 she presented to the hospital with abdominal pain.  A CT of the abdomen demonstrated a thrombus in the left renal vein.  It was not clear if this was due to lupus nephritis therefore she was admitted for further evaluation.  She was discharged on eliquis.  The EGD/colon was postponed since she was on anticoagulation.  Stool studies July 2022 were negative for fecal calprotectin.  B12 was very low at 150.  B12 injections were initiated.  Vitamin D was low at 14.  She is now on weekly vitamin D. Patient continues to have significant diarrhea.  She was recently diagnosed with amyloidosis.  She had a bone marrow biopsy done.  The path was sent to the [facility].  Multiple Myeloma is suspected.  ***6/28/23: Here for ongoing severe diarrhea. Colonoscopy 4 years ago with polyps - was having diarrhea at that time, which they thought was 2/2 medications, per patient (done at [place]). Diarrhea for years, episodic. Recently reduced Cellcept and then stopped it and switched to Plaquenil. Having up to 12-15 BMs/day, not dark, no BRBPR. Soft stools. Went to ER yesterday b/c was concerned about dehydration 2/2 diarrhea, but left. Albumin 1.7. Protein 6.5. On Pradaxa for PE, most recently dx 2/2023. Hgb 9.4. K nl. Has to wear depends, has a lot of urgency. Nocturnal BMs. Not much improvement since off the Cellcept. Ran out of B12. Has abdominal cramping. 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 Last B12 216. Fecal elastase, celiac negative.  ***8/2/23: EGD/colon negative, including bx. Noting ongoing abdominal pain and back pain. Diarrhea is somewhat improved, yesterday still had 6 BMs/day, liquid. CTAP negative last month (noncontrast). Having ongoing N/V. Bentyl unhelpful for abdominal discomfort. Pain is worse with exertion. Denies MJ.